# Patient Record
Sex: MALE | Race: WHITE | Employment: OTHER | URBAN - METROPOLITAN AREA
[De-identification: names, ages, dates, MRNs, and addresses within clinical notes are randomized per-mention and may not be internally consistent; named-entity substitution may affect disease eponyms.]

---

## 2017-10-04 ENCOUNTER — OFFICE VISIT (OUTPATIENT)
Dept: INTERNAL MEDICINE CLINIC | Age: 82
End: 2017-10-04

## 2017-10-04 VITALS
DIASTOLIC BLOOD PRESSURE: 78 MMHG | HEART RATE: 84 BPM | TEMPERATURE: 97.7 F | SYSTOLIC BLOOD PRESSURE: 120 MMHG | BODY MASS INDEX: 26.12 KG/M2 | HEIGHT: 67 IN | OXYGEN SATURATION: 95 % | WEIGHT: 166.4 LBS | RESPIRATION RATE: 12 BRPM

## 2017-10-04 DIAGNOSIS — J20.9 BRONCHITIS WITH BRONCHOSPASM: Primary | ICD-10-CM

## 2017-10-04 RX ORDER — AZITHROMYCIN 1 G/1
1 POWDER, FOR SUSPENSION ORAL
COMMUNITY
End: 2019-01-01 | Stop reason: ALTCHOICE

## 2017-10-04 RX ORDER — PREDNISONE 20 MG/1
40 TABLET ORAL
Qty: 28 TAB | Refills: 0 | Status: SHIPPED | OUTPATIENT
Start: 2017-10-04 | End: 2019-01-01 | Stop reason: ALTCHOICE

## 2017-10-04 NOTE — PROGRESS NOTES
Jose R Thomas 1923, is a 80 y.o. male, who is seen today for evaluation of persistent cough. He has been coughing for about 3 weeks and initially was treated near his home by a local doctor with azithromycin but since he has not been getting better several days ago he was started on azithromycin again and his last dose is today but he still has coughing spells. It sounds loose but he does not bring up any mucus. He has had no chills or sweats. He does not feel short of breath except when he is coughing. It is hard to communicate with him very much and he is here with his daughter today who helps explain what is going on. He is hard of hearing and he has been diagnosed with dementia by his prior regular doctor who has recently . He does not do much of anything at home and his daughter would like to get some help with bathing and someone because she simply cannot do that for him and his wife. Past Medical History:   Diagnosis Date    Abdominal pain     Asthma     Depression     GERD (gastroesophageal reflux disease)      Current Outpatient Prescriptions   Medication Sig Dispense Refill    azithromycin (ZITHROMAX) 1 gram powder Take 1 Packet by mouth now. Visit Vitals    /78    Pulse 84    Temp 97.7 °F (36.5 °C) (Oral)    Resp 12    Ht 5' 7\" (1.702 m)    Wt 166 lb 6.4 oz (75.5 kg)    SpO2 95%    BMI 26.06 kg/m2     No JVD or HJR. He is not using accessory muscles of respiration. Lungs are clear to percussion. Somewhat diminished breath sounds throughout with end expiratory wheeze and cough with forced expiration to the point of almost gagging. No crackles. Heart reveals a regular rhythm with no gallop click or rub. Extremities reveal no clubbing cyanosis or edema. Assessment: #1. Bronchitis with bronchospasm, he has never smoked. He will be treated with prednisone in addition to finishing his last day of azithromycin.   At 40 mg a day he is likely to get better and unlikely to have significant side effects as I explained to him and his daughter. #2.  He may well have significant dementia, he just does not do much of anything at home, just sits around and does need some help with bathing and other household activities, his daughter will look into what home health agency might be available for this and I will dictate a letter in support of the need. Norberto Colunga MD FACP    Please note: This document has been produced using voice recognition software. Unrecognized errors in transcription may be present.

## 2017-10-04 NOTE — MR AVS SNAPSHOT
Visit Information Date & Time Provider Department Dept. Phone Encounter #  
 10/4/2017  2:30 PM Raj Goodman MD [facility] of [address] [id number] Upcoming Health Maintenance Date Due DTaP/Tdap/Td series (1 - Tdap) 2/26/1944 ZOSTER VACCINE AGE 60> 12/26/1982 GLAUCOMA SCREENING Q2Y 2/26/1988 Pneumococcal 65+ Low/Medium Risk (1 of 2 - PCV13) 2/26/1988 MEDICARE YEARLY EXAM 4/9/2017 INFLUENZA AGE 9 TO ADULT 8/1/2017 Allergies as of 10/4/2017  Review Complete On: 10/4/2017 By: Raj Goodman MD  
  
 Severity Noted Reaction Type Reactions Vicodin [Hydrocodone-acetaminophen]    Unknown (comments) Pt. States he is not allergic to Vicodin Current Immunizations  Never Reviewed Name Date Influenza High Dose Vaccine PF 9/15/2015 Not reviewed this visit You Were Diagnosed With   
  
 Codes Comments Bronchitis with bronchospasm    -  Primary ICD-10-CM: J20.9 ICD-9-CM: 464 Vitals BP Pulse Temp Resp Height(growth percentile) Weight(growth percentile) 120/78 84 97.7 °F (36.5 °C) (Oral) 12 5' 7\" (1.702 m) 166 lb 6.4 oz (75.5 kg) SpO2 BMI Smoking Status 95% 26.06 kg/m2 Never Smoker Vitals History BMI and BSA Data Body Mass Index Body Surface Area 26.06 kg/m 2 1.89 m 2 Your Updated Medication List  
  
   
This list is accurate as of: 10/4/17  2:58 PM.  Always use your most recent med list. ZITHROMAX 1 gram powder Generic drug:  azithromycin Take 1 Packet by mouth now. Introducing [facility]! Cheryl Warren introduces [employer] patient portal. Now you can access parts of your medical record, email your doctor's office, and request medication refills online. 1. In your internet browser, go to [email or web address] 2. Click on the First Time User? Click Here link in the Sign In box. You will see the New Member Sign Up page. 3. Enter your CrossReader Access Code exactly as it appears below. You will not need to use this code after youve completed the sign-up process. If you do not sign up before the expiration date, you must request a new code. · CrossReader Access Code: [email or web address] Expires: 1/2/2018  2:58 PM 
 
4. Enter the last four digits of your Social Security Number (xxxx) and Date of Birth (mm/dd/yyyy) as indicated and click Submit. You will be taken to the next sign-up page. 5. Create a CrossReader ID. This will be your CrossReader login ID and cannot be changed, so think of one that is secure and easy to remember. 6. Create a CrossReader password. You can change your password at any time. 7. Enter your Password Reset Question and Answer. This can be used at a later time if you forget your password. 8. Enter your e-mail address. You will receive e-mail notification when new information is available in [address]. 9. Click Sign Up. You can now view and download portions of your medical record. 10. Click the Download Summary menu link to download a portable copy of your medical information. If you have questions, please visit the Frequently Asked Questions section of the CrossReader website. Remember, CrossReader is NOT to be used for urgent needs. For medical emergencies, dial 911. Now available from your iPhone and Android! Please provide this summary of care documentation to your next provider. Your primary care clinician is listed as Lamount Marinas. Burton Fabry. If you have any questions after today's visit, please call [phone].

## 2017-10-04 NOTE — LETTER
10/4/2017 3:03 PM 
 
Mr. Anita Mueller 
[address] [place] [id number] To Whom It May Concern: 
 
Anita Mueller was reevaluated today. At this point he is unable to properly care for himself and he needs home health assistance with bathing and other ADLs, perhaps 3 times a week. Please evaluate and treat as indicated. Sincerely, George Spears MD, FACP

## 2018-04-23 ENCOUNTER — TELEPHONE (OUTPATIENT)
Dept: INTERNAL MEDICINE CLINIC | Age: 83
End: 2018-04-23

## 2018-04-23 NOTE — TELEPHONE ENCOUNTER
Kela Bernheim is calling from [facility] trauma unit requesting med history, med list and last office note.     Fax: [phone]

## 2019-01-01 ENCOUNTER — TELEPHONE (OUTPATIENT)
Dept: INTERNAL MEDICINE CLINIC | Age: 84
End: 2019-01-01

## 2019-01-01 ENCOUNTER — OFFICE VISIT (OUTPATIENT)
Dept: INTERNAL MEDICINE CLINIC | Age: 84
End: 2019-01-01

## 2019-01-01 VITALS
BODY MASS INDEX: 25.49 KG/M2 | RESPIRATION RATE: 16 BRPM | WEIGHT: 162.4 LBS | HEIGHT: 67 IN | DIASTOLIC BLOOD PRESSURE: 82 MMHG | SYSTOLIC BLOOD PRESSURE: 130 MMHG | HEART RATE: 82 BPM | TEMPERATURE: 97.3 F | OXYGEN SATURATION: 96 %

## 2019-01-01 DIAGNOSIS — I10 PRIMARY HYPERTENSION: ICD-10-CM

## 2019-01-01 DIAGNOSIS — R63.4 WEIGHT LOSS: Primary | ICD-10-CM

## 2019-01-01 DIAGNOSIS — R63.4 WEIGHT LOSS: ICD-10-CM

## 2019-01-01 DIAGNOSIS — H91.93 PROFOUND HEARING LOSS OF BOTH EARS: Primary | ICD-10-CM

## 2019-01-01 DIAGNOSIS — I10 ESSENTIAL HYPERTENSION: ICD-10-CM

## 2019-06-04 NOTE — PROGRESS NOTES
Becka Lopez 2/26/1923, is a 80 y.o. male, who is seen today for evaluation of profound hearing loss, GERD, hyperlipidemia and asthma. He has been able to hear well for a very long time and today even yelling at him he cannot understand anything I asked him. His wife says he has hearing aids but refuses to use them. He has a history of GERD but wife says he has not been having symptoms for quite some time. He no longer requires medication for reflux. He has a remote history of asthma and occasionally coughs but nothing regular and no recent apparent significant dyspnea or wheezing. He has hyperlipidemia his wife gives him healthy food, his weight is up 7 pounds from when I last saw him 3 years ago. Past Medical History:   Diagnosis Date    Abdominal pain     Asthma     Depression     GERD (gastroesophageal reflux disease)      History reviewed. No pertinent surgical history. Allergies   Allergen Reactions    Vicodin [Hydrocodone-Acetaminophen] Unknown (comments)     Pt. States he is not allergic to Vicodin     He takes no medication. Social History     Socioeconomic History    Marital status:      Spouse name: Not on file    Number of children: Not on file    Years of education: Not on file    Highest education level: Not on file   Tobacco Use    Smoking status: Never Smoker    Smokeless tobacco: Never Used   Substance and Sexual Activity    Alcohol use: No    Drug use: No     Visit Vitals  /82 (BP 1 Location: Right arm, BP Patient Position: Sitting)   Pulse 82   Temp 97.3 °F (36.3 °C) (Axillary)   Resp 16   Ht 5' 7\" (1.702 m)   Wt 162 lb 6.4 oz (73.7 kg)   SpO2 96%   BMI 25.44 kg/m²     Ear canals and tympanic membranes are unremarkable. Oral cavity reveals no lesions. Neck reveals no adenopathy or thyromegaly. Carotids are 2+ without bruits.   I tried to listen to his lungs but despite yelling at him to take deep breaths and his wife yelling at him to take deep breaths all he did was the yell one time and would not take deep breaths. No obvious wheezing or crackles. Heart reveals a regular rhythm with normal S1 and S2 no murmur gallop click or rub. Full impulse is not palpable. Abdomen is soft and nontender with no hepatospleno megaly or masses and no bruits. Extremities reveal no clubbing cyanosis or edema. Pulses are intact but diminished in the feet. Dorsalis pedis pulses are not palpable. Lab apparently was done yesterday near his home, I do not have those results. Call results when available. Assessment: #1. Hyperlipidemia, diet avoid significant further weight gain and we will call lab when available. Statin therapy is not appropriate in this 80year-old gentleman. #2.  GERD no longer symptomatic. #3. History of asthma but with no obvious wheezing but I cannot get him to take medium or deep breaths here in the office today. He did not cough at all while he was here today. #4.  Profound hearing loss, declines to use his hearing aids. We will call lab results when available. Follow-up ELZA Delarosa. Meaghan Graham MD FACP    Please note: This document has been produced using voice recognition software. Unrecognized errors in transcription may be present. This visit lasted 25 minutes, greater than 50% of the time was spent counseling, gave his wife the directions as above since he cannot hear me and I did not see the need to write everything out for him since there are really no specific directions other than avoiding significant for the weight gain. Norberto Graham MD FACP    Please note: This document has been produced using voice recognition software. Unrecognized errors in transcription may be present.

## 2019-06-04 NOTE — PROGRESS NOTES
1. Have you been to the ER, urgent care clinic or hospitalized since your last visit? NO.     2. Have you seen or consulted any other health care providers outside of the [address] [place] [address] since your last visit (Include any pap smears or colon screening)? NO      Do you have an Advanced Directive? NO    Would you like information on Advanced Directives?  NO